# Patient Record
Sex: FEMALE | Race: OTHER | HISPANIC OR LATINO | URBAN - METROPOLITAN AREA
[De-identification: names, ages, dates, MRNs, and addresses within clinical notes are randomized per-mention and may not be internally consistent; named-entity substitution may affect disease eponyms.]

---

## 2019-04-01 PROBLEM — Z00.00 ENCOUNTER FOR PREVENTIVE HEALTH EXAMINATION: Status: ACTIVE | Noted: 2019-04-01

## 2019-09-04 ENCOUNTER — OUTPATIENT (OUTPATIENT)
Dept: OUTPATIENT SERVICES | Facility: HOSPITAL | Age: 34
LOS: 1 days | End: 2019-09-04
Payer: COMMERCIAL

## 2019-09-04 ENCOUNTER — EMERGENCY (EMERGENCY)
Facility: HOSPITAL | Age: 34
LOS: 1 days | Discharge: HOME | End: 2019-09-04
Attending: EMERGENCY MEDICINE
Payer: COMMERCIAL

## 2019-09-04 VITALS
HEART RATE: 99 BPM | HEIGHT: 64 IN | SYSTOLIC BLOOD PRESSURE: 148 MMHG | TEMPERATURE: 98 F | RESPIRATION RATE: 18 BRPM | OXYGEN SATURATION: 99 % | WEIGHT: 209 LBS | DIASTOLIC BLOOD PRESSURE: 84 MMHG

## 2019-09-04 VITALS — RESPIRATION RATE: 16 BRPM | TEMPERATURE: 98 F

## 2019-09-04 VITALS — TEMPERATURE: 99 F | RESPIRATION RATE: 17 BRPM

## 2019-09-04 DIAGNOSIS — Z98.891 HISTORY OF UTERINE SCAR FROM PREVIOUS SURGERY: Chronic | ICD-10-CM

## 2019-09-04 DIAGNOSIS — O11.9 PRE-EXISTING HYPERTENSION WITH PRE-ECLAMPSIA, UNSPECIFIED TRIMESTER: ICD-10-CM

## 2019-09-04 LAB
ALBUMIN SERPL ELPH-MCNC: 3.7 G/DL — SIGNIFICANT CHANGE UP (ref 3.3–5.2)
ALBUMIN SERPL ELPH-MCNC: 3.9 G/DL — SIGNIFICANT CHANGE UP (ref 3.3–5.2)
ALP SERPL-CCNC: 105 U/L — SIGNIFICANT CHANGE UP (ref 40–120)
ALP SERPL-CCNC: 99 U/L — SIGNIFICANT CHANGE UP (ref 40–120)
ALT FLD-CCNC: 24 U/L — SIGNIFICANT CHANGE UP
ALT FLD-CCNC: 25 U/L — SIGNIFICANT CHANGE UP
ANION GAP SERPL CALC-SCNC: 11 MMOL/L — SIGNIFICANT CHANGE UP (ref 5–17)
ANION GAP SERPL CALC-SCNC: 13 MMOL/L — SIGNIFICANT CHANGE UP (ref 5–17)
APPEARANCE UR: CLEAR — SIGNIFICANT CHANGE UP
APTT BLD: 31.5 SEC — SIGNIFICANT CHANGE UP (ref 27.5–36.3)
AST SERPL-CCNC: 15 U/L — SIGNIFICANT CHANGE UP
AST SERPL-CCNC: 16 U/L — SIGNIFICANT CHANGE UP
BASOPHILS # BLD AUTO: 0.02 K/UL — SIGNIFICANT CHANGE UP (ref 0–0.2)
BASOPHILS # BLD AUTO: 0.04 K/UL — SIGNIFICANT CHANGE UP (ref 0–0.2)
BASOPHILS NFR BLD AUTO: 0.3 % — SIGNIFICANT CHANGE UP (ref 0–2)
BASOPHILS NFR BLD AUTO: 0.5 % — SIGNIFICANT CHANGE UP (ref 0–2)
BILIRUB SERPL-MCNC: 0.3 MG/DL — LOW (ref 0.4–2)
BILIRUB SERPL-MCNC: 0.3 MG/DL — LOW (ref 0.4–2)
BILIRUB UR-MCNC: NEGATIVE — SIGNIFICANT CHANGE UP
BUN SERPL-MCNC: 11 MG/DL — SIGNIFICANT CHANGE UP (ref 8–20)
BUN SERPL-MCNC: 11 MG/DL — SIGNIFICANT CHANGE UP (ref 8–20)
CALCIUM SERPL-MCNC: 9.5 MG/DL — SIGNIFICANT CHANGE UP (ref 8.6–10.2)
CALCIUM SERPL-MCNC: 9.7 MG/DL — SIGNIFICANT CHANGE UP (ref 8.6–10.2)
CHLORIDE SERPL-SCNC: 108 MMOL/L — HIGH (ref 98–107)
CHLORIDE SERPL-SCNC: 109 MMOL/L — HIGH (ref 98–107)
CO2 SERPL-SCNC: 22 MMOL/L — SIGNIFICANT CHANGE UP (ref 22–29)
CO2 SERPL-SCNC: 24 MMOL/L — SIGNIFICANT CHANGE UP (ref 22–29)
COLOR SPEC: YELLOW — SIGNIFICANT CHANGE UP
CREAT ?TM UR-MCNC: 140 MG/DL — SIGNIFICANT CHANGE UP
CREAT SERPL-MCNC: 0.4 MG/DL — LOW (ref 0.5–1.3)
CREAT SERPL-MCNC: 0.54 MG/DL — SIGNIFICANT CHANGE UP (ref 0.5–1.3)
DIFF PNL FLD: ABNORMAL
EOSINOPHIL # BLD AUTO: 0.27 K/UL — SIGNIFICANT CHANGE UP (ref 0–0.5)
EOSINOPHIL # BLD AUTO: 0.29 K/UL — SIGNIFICANT CHANGE UP (ref 0–0.5)
EOSINOPHIL NFR BLD AUTO: 3.2 % — SIGNIFICANT CHANGE UP (ref 0–6)
EOSINOPHIL NFR BLD AUTO: 3.7 % — SIGNIFICANT CHANGE UP (ref 0–6)
EPI CELLS # UR: SIGNIFICANT CHANGE UP
FIBRINOGEN PPP-MCNC: 620 MG/DL — HIGH (ref 350–510)
GLUCOSE SERPL-MCNC: 88 MG/DL — SIGNIFICANT CHANGE UP (ref 70–115)
GLUCOSE SERPL-MCNC: 89 MG/DL — SIGNIFICANT CHANGE UP (ref 70–115)
GLUCOSE UR QL: NEGATIVE MG/DL — SIGNIFICANT CHANGE UP
HCT VFR BLD CALC: 33.9 % — LOW (ref 34.5–45)
HCT VFR BLD CALC: 35.5 % — SIGNIFICANT CHANGE UP (ref 34.5–45)
HGB BLD-MCNC: 11.4 G/DL — LOW (ref 11.5–15.5)
HGB BLD-MCNC: 11.5 G/DL — SIGNIFICANT CHANGE UP (ref 11.5–15.5)
IMM GRANULOCYTES NFR BLD AUTO: 1 % — SIGNIFICANT CHANGE UP (ref 0–1.5)
IMM GRANULOCYTES NFR BLD AUTO: 1.1 % — SIGNIFICANT CHANGE UP (ref 0–1.5)
INR BLD: 0.98 RATIO — SIGNIFICANT CHANGE UP (ref 0.88–1.16)
KETONES UR-MCNC: ABNORMAL
LDH SERPL L TO P-CCNC: 194 U/L — HIGH (ref 98–192)
LEUKOCYTE ESTERASE UR-ACNC: ABNORMAL
LYMPHOCYTES # BLD AUTO: 2.32 K/UL — SIGNIFICANT CHANGE UP (ref 1–3.3)
LYMPHOCYTES # BLD AUTO: 2.38 K/UL — SIGNIFICANT CHANGE UP (ref 1–3.3)
LYMPHOCYTES # BLD AUTO: 27.3 % — SIGNIFICANT CHANGE UP (ref 13–44)
LYMPHOCYTES # BLD AUTO: 30.4 % — SIGNIFICANT CHANGE UP (ref 13–44)
MAGNESIUM SERPL-MCNC: 2.1 MG/DL — SIGNIFICANT CHANGE UP (ref 1.6–2.6)
MCHC RBC-ENTMCNC: 31.1 PG — SIGNIFICANT CHANGE UP (ref 27–34)
MCHC RBC-ENTMCNC: 32.1 PG — SIGNIFICANT CHANGE UP (ref 27–34)
MCHC RBC-ENTMCNC: 32.4 GM/DL — SIGNIFICANT CHANGE UP (ref 32–36)
MCHC RBC-ENTMCNC: 33.6 GM/DL — SIGNIFICANT CHANGE UP (ref 32–36)
MCV RBC AUTO: 95.5 FL — SIGNIFICANT CHANGE UP (ref 80–100)
MCV RBC AUTO: 95.9 FL — SIGNIFICANT CHANGE UP (ref 80–100)
MONOCYTES # BLD AUTO: 0.43 K/UL — SIGNIFICANT CHANGE UP (ref 0–0.9)
MONOCYTES # BLD AUTO: 0.5 K/UL — SIGNIFICANT CHANGE UP (ref 0–0.9)
MONOCYTES NFR BLD AUTO: 5.5 % — SIGNIFICANT CHANGE UP (ref 2–14)
MONOCYTES NFR BLD AUTO: 5.9 % — SIGNIFICANT CHANGE UP (ref 2–14)
NEUTROPHILS # BLD AUTO: 4.64 K/UL — SIGNIFICANT CHANGE UP (ref 1.8–7.4)
NEUTROPHILS # BLD AUTO: 5.29 K/UL — SIGNIFICANT CHANGE UP (ref 1.8–7.4)
NEUTROPHILS NFR BLD AUTO: 59.1 % — SIGNIFICANT CHANGE UP (ref 43–77)
NEUTROPHILS NFR BLD AUTO: 62 % — SIGNIFICANT CHANGE UP (ref 43–77)
NITRITE UR-MCNC: NEGATIVE — SIGNIFICANT CHANGE UP
PH UR: 6 — SIGNIFICANT CHANGE UP (ref 5–8)
PLATELET # BLD AUTO: 417 K/UL — HIGH (ref 150–400)
PLATELET # BLD AUTO: 423 K/UL — HIGH (ref 150–400)
POTASSIUM SERPL-MCNC: 3.8 MMOL/L — SIGNIFICANT CHANGE UP (ref 3.5–5.3)
POTASSIUM SERPL-MCNC: 4.3 MMOL/L — SIGNIFICANT CHANGE UP (ref 3.5–5.3)
POTASSIUM SERPL-SCNC: 3.8 MMOL/L — SIGNIFICANT CHANGE UP (ref 3.5–5.3)
POTASSIUM SERPL-SCNC: 4.3 MMOL/L — SIGNIFICANT CHANGE UP (ref 3.5–5.3)
PROT ?TM UR-MCNC: 17 MG/DL — HIGH (ref 0–12)
PROT SERPL-MCNC: 6.5 G/DL — LOW (ref 6.6–8.7)
PROT SERPL-MCNC: 7 G/DL — SIGNIFICANT CHANGE UP (ref 6.6–8.7)
PROT UR-MCNC: 15 MG/DL
PROT/CREAT UR-RTO: 0.1 RATIO — SIGNIFICANT CHANGE UP
PROTHROM AB SERPL-ACNC: 11.3 SEC — SIGNIFICANT CHANGE UP (ref 10–12.9)
RBC # BLD: 3.55 M/UL — LOW (ref 3.8–5.2)
RBC # BLD: 3.7 M/UL — LOW (ref 3.8–5.2)
RBC # FLD: 13.5 % — SIGNIFICANT CHANGE UP (ref 10.3–14.5)
RBC # FLD: 13.5 % — SIGNIFICANT CHANGE UP (ref 10.3–14.5)
RBC CASTS # UR COMP ASSIST: ABNORMAL /HPF (ref 0–4)
SODIUM SERPL-SCNC: 143 MMOL/L — SIGNIFICANT CHANGE UP (ref 135–145)
SODIUM SERPL-SCNC: 144 MMOL/L — SIGNIFICANT CHANGE UP (ref 135–145)
SP GR SPEC: 1.02 — SIGNIFICANT CHANGE UP (ref 1.01–1.02)
URATE SERPL-MCNC: 6 MG/DL — HIGH (ref 2.4–5.7)
UROBILINOGEN FLD QL: 4 MG/DL
WBC # BLD: 7.84 K/UL — SIGNIFICANT CHANGE UP (ref 3.8–10.5)
WBC # BLD: 8.51 K/UL — SIGNIFICANT CHANGE UP (ref 3.8–10.5)
WBC # FLD AUTO: 7.84 K/UL — SIGNIFICANT CHANGE UP (ref 3.8–10.5)
WBC # FLD AUTO: 8.51 K/UL — SIGNIFICANT CHANGE UP (ref 3.8–10.5)
WBC UR QL: SIGNIFICANT CHANGE UP

## 2019-09-04 PROCEDURE — 84550 ASSAY OF BLOOD/URIC ACID: CPT

## 2019-09-04 PROCEDURE — 96372 THER/PROPH/DIAG INJ SC/IM: CPT

## 2019-09-04 PROCEDURE — 80053 COMPREHEN METABOLIC PANEL: CPT

## 2019-09-04 PROCEDURE — 36415 COLL VENOUS BLD VENIPUNCTURE: CPT

## 2019-09-04 PROCEDURE — 85027 COMPLETE CBC AUTOMATED: CPT

## 2019-09-04 PROCEDURE — 99283 EMERGENCY DEPT VISIT LOW MDM: CPT

## 2019-09-04 PROCEDURE — 81001 URINALYSIS AUTO W/SCOPE: CPT

## 2019-09-04 PROCEDURE — 99284 EMERGENCY DEPT VISIT MOD MDM: CPT

## 2019-09-04 PROCEDURE — G0463: CPT

## 2019-09-04 PROCEDURE — 85610 PROTHROMBIN TIME: CPT

## 2019-09-04 PROCEDURE — 83735 ASSAY OF MAGNESIUM: CPT

## 2019-09-04 PROCEDURE — 85730 THROMBOPLASTIN TIME PARTIAL: CPT

## 2019-09-04 PROCEDURE — 82570 ASSAY OF URINE CREATININE: CPT

## 2019-09-04 PROCEDURE — 85384 FIBRINOGEN ACTIVITY: CPT

## 2019-09-04 PROCEDURE — 83615 LACTATE (LD) (LDH) ENZYME: CPT

## 2019-09-04 PROCEDURE — 84156 ASSAY OF PROTEIN URINE: CPT

## 2019-09-04 RX ORDER — METFORMIN HYDROCHLORIDE 850 MG/1
1 TABLET ORAL
Qty: 0 | Refills: 0 | DISCHARGE

## 2019-09-04 RX ORDER — LABETALOL HCL 100 MG
200 TABLET ORAL
Refills: 0 | Status: DISCONTINUED | OUTPATIENT
Start: 2019-09-04 | End: 2019-09-19

## 2019-09-04 RX ORDER — LABETALOL HCL 100 MG
1 TABLET ORAL
Qty: 28 | Refills: 0
Start: 2019-09-04 | End: 2019-09-17

## 2019-09-04 RX ORDER — LABETALOL HCL 100 MG
20 TABLET ORAL ONCE
Refills: 0 | Status: COMPLETED | OUTPATIENT
Start: 2019-09-04 | End: 2019-09-04

## 2019-09-04 RX ADMIN — Medication 200 MILLIGRAM(S): at 22:11

## 2019-09-04 RX ADMIN — Medication 20 MILLIGRAM(S): at 21:11

## 2019-09-04 NOTE — ED PROVIDER NOTE - CLINICAL SUMMARY MEDICAL DECISION MAKING FREE TEXT BOX
pt with htn post partum, had preeclampsia, mild tingling and swelling LE. labs sent, [facility] D accepting pt for management.

## 2019-09-04 NOTE — ED ADULT TRIAGE NOTE - CHIEF COMPLAINT QUOTE
Pt delivered last Wednesday pt was on Mag for HBP pt had HBP during pregnancy and last 2 days pt's BP has been high and was told to come to ED from [facility] for HTN & post partum edema.

## 2019-09-04 NOTE — ED ADULT TRIAGE NOTE - NS ED NURSE BANDS TYPE
Patient transferred to [facility] with myself and Jw Teran with 1 bag of belongins with security.  called and informed of transfer to  2. Patient searched for contraband. SVC maintained.    Name band;

## 2019-09-04 NOTE — ED PROVIDER NOTE - OBJECTIVE STATEMENT
35 y/o  with full term delivery last week with preeclampsia present with htn at home. Family monitoring bp at home and was ~150/90. Pt states has had some b/l tingling fingers and toes, mild swelling b/l LE, ongoing for many days/weeks, no acute change. Headache yesterday, none today, no vision changes, no other neurlogic symptoms. No cp, sob/abd pain. Pt had csectiona nd healing well, no fever.     ROS: No fever/chills. No eye pain/changes in vision, No ear pain/sore throat/dysphagia, No chest pain/palpitations. No SOB/cough/. No abdominal pain, N/V/D, no black/bloody bm. No dysuria/frequency/discharge, No Dizziness.    No rashes or breaks in skin.

## 2019-09-04 NOTE — ED PROVIDER NOTE - PHYSICAL EXAMINATION
Problem: Spiritual Distress, Risk/Actual (Adult,Obstetrics,Pediatric)  Intervention: Facilitate Personal Exploration/Expression of Spirituality  Provided initial visit. Introduced and offered pastoral support with prayer upon request. Pt made aware of 's presence as needed.          Gen: No acute distress, non toxic  HEENT: Mucous membranes moist, pink conjunctivae, EOMI  CV: RRR, nl s1/s2.  Resp: CTAB, normal rate and effort  GI: Abdomen soft, NT, ND. No rebound, no guarding. c section healing, mild soreness but no rebound/guarding.   : No CVAT  Neuro: A&O x 3, moving all 4 extremities. nl finger to nose.   MSK: No spine or joint tenderness to palpation  Skin: No rashes. intact and perfused.

## 2019-09-04 NOTE — OB PROVIDER TRIAGE NOTE - HISTORY OF PRESENT ILLNESS
Patient is a 35yo  POD 7 from a pCS complicated by pre-eclampsia s/p magnesium. She currently has a headache, has been seeing "stars" for the past two days. Earlier today after a nap, her  measured her Bp and got 159/113 on home device. Abdomen is mildly tender, patient c/o swelling in her hands and feet along with numbness and tingling. Her feet are very tender and have been since delivery. She denies any fevers or chills, and her incision is clean, dry and intact. She denies any nausea, vomiting.     Pregnancy complicated by: GDMa2, pre-eclampsia    PMH: Hypertension, patient took oral medication 4 yrs ago for a year but has been unmedicated since then. PCOS  PSH: hemorrhoidectomy   Meds:PNV, ibuprofen, percocet s/p pCS, metformin 500mg BID  Allergies: Latex  OBhx: SAB     Physical exam:  General: patient is resting in bed in NAD  Cardio: RRR  Pulm: CTABL  Abd: tender s/p pCS      A/P: Patient is a 35yo  POD#7 for r/o PP pre-eclampsia  -SBP >160 x2 15 minutes apart, 20mg labetalol IV push  -Observe and recheck BP Q20 minutes  -Patient already received PP magnesium s/p pCS at [facility], therefore will not start mag here. Will start on PO labetalol 200mg BID.   -Continue to monitor Patient is a 33yo  POD 7 from a pCS complicated by pre-eclampsia s/p magnesium. She currently has a headache, has been seeing "stars" for the past two days. Earlier today after a nap, her  measured her Bp and got 159/113 on home device. Abdomen is mildly tender, patient c/o swelling in her hands and feet along with numbness and tingling. Her feet are very tender and have been since delivery. She denies any fevers, chills, nausea, vomiting.     Pregnancy was complicated by: GDMa2, pre-eclampsia    PMH: Hypertension, patient took oral medication 4 yrs ago for a year but has been unmedicated since then. PCOS  PSH: hemorrhoidectomy   Meds: PNV, metformin 500mg BID (was taking for A2GDM and is still taking?)  Allergies: Latex  OBhx: SAB , pCS POD#7    Physical exam:  General: patient is resting in bed in NAD  Cardio: RRR  Pulm: CTABL  Abd: tender s/p pCS, incision is clean, dry and intact    A/P: Patient is a 33yo  POD#7 for r/o PP pre-eclampsia  -SBP >160 x2 15 minutes apart, 20mg labetalol IV push  -Observe and recheck BP Q20 minutes  -Patient already received PP magnesium s/p pCS at [facility], therefore will not start mag here. Will start on PO labetalol 200mg BID.   -Continue to monitor Patient is a 35yo  POD 7 from a pCS complicated by pre-eclampsia s/p magnesium. She currently has a headache, has been seeing "stars" for the past two days. Earlier today after a nap, her  measured her Bp and got 159/113 on home device. Abdomen is mildly tender, patient c/o swelling in her hands and feet along with numbness and tingling. Her feet are very tender and have been since delivery. She denies any fevers, chills, nausea, vomiting.     Pregnancy was complicated by: GDMa2, pre-eclampsia    PMH: Hypertension, patient took oral medication 4 yrs ago for a year but has been unmedicated since then. PCOS  PSH: hemorrhoidectomy   Meds: PNV, metformin 500mg BID (was taking for A2GDM and is still taking?)  Allergies: Latex  OBhx: SAB , pCS POD#7    Physical exam:  General: patient is resting in bed in NAD  Cardio: RRR  Pulm: CTABL  Abd: tender s/p pCS, incision is clean, dry and intact    A/P: Patient is a 35yo  POD#7 for r/o PP pre-eclampsia  -SBP >160 x2 15 minutes apart, 20mg labetalol IV push  -Observe and recheck BP Q20 minutes  -Patient already received PP magnesium s/p pCS at [facility], therefore will not start mag here. Will start on PO labetalol 200mg BID.   -Pressures normalized while under observation  -Patient was d/c with prescription for labetalol 200mg BID, and encouraged to follow up with PCP within 1 week.

## 2019-09-04 NOTE — ED STATDOCS - PROGRESS NOTE DETAILS
33 y/o F pt with no significant PMHx presents to the ED c/o hypertension. Pt is one week post-partum, and was discharged from the hospital with no medication for her HTN. Today she visited [facility], and at home per pt's  at the bedside her pressure has been 150/100. [facility] called. 33 y/o F pt with no significant PMHx presents to the ED c/o hypertension and FILOMENA pedal edema. Pt is one week post-partum, and was discharged from the hospital with no medication for her HTN. Today she visited [facility], and at home per pt's  at the bedside her pressure has been 150/100. [facility] called.

## 2023-04-13 ENCOUNTER — OFFICE (OUTPATIENT)
Dept: URBAN - METROPOLITAN AREA CLINIC 104 | Facility: CLINIC | Age: 38
Setting detail: OPHTHALMOLOGY
End: 2023-04-13
Payer: MEDICAID

## 2023-04-13 DIAGNOSIS — H11.32: ICD-10-CM

## 2023-04-13 DIAGNOSIS — H01.005: ICD-10-CM

## 2023-04-13 DIAGNOSIS — E11.9: ICD-10-CM

## 2023-04-13 DIAGNOSIS — H04.123: ICD-10-CM

## 2023-04-13 DIAGNOSIS — H35.033: ICD-10-CM

## 2023-04-13 DIAGNOSIS — H01.004: ICD-10-CM

## 2023-04-13 DIAGNOSIS — H01.001: ICD-10-CM

## 2023-04-13 DIAGNOSIS — H01.002: ICD-10-CM

## 2023-04-13 PROBLEM — H53.143: Status: ACTIVE | Noted: 2023-04-13

## 2023-04-13 PROCEDURE — 92004 COMPRE OPH EXAM NEW PT 1/>: CPT | Performed by: OPTOMETRIST

## 2023-04-13 ASSESSMENT — KERATOMETRY
OS_K1POWER_DIOPTERS: 38.27
OD_K1POWER_DIOPTERS: 38.66
OS_K2POWER_DIOPTERS: 40.27
OD_K2POWER_DIOPTERS: 40.37
OS_AXISANGLE_DEGREES: 110
OD_AXISANGLE_DEGREES: 091

## 2023-04-13 ASSESSMENT — CONFRONTATIONAL VISUAL FIELD TEST (CVF)
OS_FINDINGS: FULL
OD_FINDINGS: FULL

## 2023-04-13 ASSESSMENT — VISUAL ACUITY
OD_BCVA: 20/30
OS_BCVA: 20/25

## 2023-04-13 ASSESSMENT — REFRACTION_AUTOREFRACTION
OD_AXIS: 009
OS_SPHERE: 0.00
OD_SPHERE: 0.00
OS_CYLINDER: -1.50
OS_AXIS: 023
OD_CYLINDER: -1.25

## 2023-04-13 ASSESSMENT — LID EXAM ASSESSMENTS
OS_BLEPHARITIS: LLL LUL 1+
OD_BLEPHARITIS: RLL RUL 1+

## 2023-04-13 ASSESSMENT — TONOMETRY
OS_IOP_MMHG: 16
OD_IOP_MMHG: 16

## 2023-04-13 ASSESSMENT — SPHEQUIV_DERIVED
OS_SPHEQUIV: -0.75
OD_SPHEQUIV: -0.625

## 2023-04-13 ASSESSMENT — AXIALLENGTH_DERIVED
OD_AL: 25.43
OS_AL: 25.59

## 2023-07-27 ENCOUNTER — EMERGENCY (EMERGENCY)
Facility: HOSPITAL | Age: 38
LOS: 1 days | Discharge: HOME | End: 2023-07-27
Attending: EMERGENCY MEDICINE
Payer: MEDICAID

## 2023-07-27 VITALS
TEMPERATURE: 98 F | WEIGHT: 199.96 LBS | RESPIRATION RATE: 16 BRPM | HEART RATE: 88 BPM | OXYGEN SATURATION: 98 % | DIASTOLIC BLOOD PRESSURE: 105 MMHG | SYSTOLIC BLOOD PRESSURE: 157 MMHG

## 2023-07-27 VITALS
SYSTOLIC BLOOD PRESSURE: 122 MMHG | HEART RATE: 85 BPM | TEMPERATURE: 99 F | OXYGEN SATURATION: 97 % | DIASTOLIC BLOOD PRESSURE: 77 MMHG | RESPIRATION RATE: 18 BRPM

## 2023-07-27 DIAGNOSIS — Z98.891 HISTORY OF UTERINE SCAR FROM PREVIOUS SURGERY: Chronic | ICD-10-CM

## 2023-07-27 LAB
ALBUMIN SERPL ELPH-MCNC: 3.8 G/DL — SIGNIFICANT CHANGE UP (ref 3.5–5)
ALP SERPL-CCNC: 63 U/L — SIGNIFICANT CHANGE UP (ref 40–120)
ALT FLD-CCNC: 104 U/L DA — HIGH (ref 10–60)
ANION GAP SERPL CALC-SCNC: 12 MMOL/L — SIGNIFICANT CHANGE UP (ref 5–17)
AST SERPL-CCNC: 38 U/L — SIGNIFICANT CHANGE UP (ref 10–40)
BASOPHILS # BLD AUTO: 0.05 K/UL — SIGNIFICANT CHANGE UP (ref 0–0.2)
BASOPHILS NFR BLD AUTO: 0.5 % — SIGNIFICANT CHANGE UP (ref 0–2)
BILIRUB SERPL-MCNC: 0.3 MG/DL — SIGNIFICANT CHANGE UP (ref 0.2–1.2)
BLD GP AB SCN SERPL QL: SIGNIFICANT CHANGE UP
BUN SERPL-MCNC: 18 MG/DL — SIGNIFICANT CHANGE UP (ref 7–18)
CALCIUM SERPL-MCNC: 9 MG/DL — SIGNIFICANT CHANGE UP (ref 8.4–10.5)
CHLORIDE SERPL-SCNC: 104 MMOL/L — SIGNIFICANT CHANGE UP (ref 96–108)
CO2 SERPL-SCNC: 21 MMOL/L — LOW (ref 22–31)
CREAT SERPL-MCNC: 0.83 MG/DL — SIGNIFICANT CHANGE UP (ref 0.5–1.3)
EGFR: 91 ML/MIN/1.73M2 — SIGNIFICANT CHANGE UP
EOSINOPHIL # BLD AUTO: 0.2 K/UL — SIGNIFICANT CHANGE UP (ref 0–0.5)
EOSINOPHIL NFR BLD AUTO: 2.2 % — SIGNIFICANT CHANGE UP (ref 0–6)
GLUCOSE SERPL-MCNC: 244 MG/DL — HIGH (ref 70–99)
HCG SERPL-ACNC: 5582 MIU/ML — HIGH
HCT VFR BLD CALC: 37.6 % — SIGNIFICANT CHANGE UP (ref 34.5–45)
HGB BLD-MCNC: 12.5 G/DL — SIGNIFICANT CHANGE UP (ref 11.5–15.5)
IMM GRANULOCYTES NFR BLD AUTO: 0.3 % — SIGNIFICANT CHANGE UP (ref 0–0.9)
LIDOCAIN IGE QN: 128 U/L — SIGNIFICANT CHANGE UP (ref 73–393)
LYMPHOCYTES # BLD AUTO: 2.34 K/UL — SIGNIFICANT CHANGE UP (ref 1–3.3)
LYMPHOCYTES # BLD AUTO: 25.2 % — SIGNIFICANT CHANGE UP (ref 13–44)
MCHC RBC-ENTMCNC: 31.1 PG — SIGNIFICANT CHANGE UP (ref 27–34)
MCHC RBC-ENTMCNC: 33.2 GM/DL — SIGNIFICANT CHANGE UP (ref 32–36)
MCV RBC AUTO: 93.5 FL — SIGNIFICANT CHANGE UP (ref 80–100)
MONOCYTES # BLD AUTO: 0.53 K/UL — SIGNIFICANT CHANGE UP (ref 0–0.9)
MONOCYTES NFR BLD AUTO: 5.7 % — SIGNIFICANT CHANGE UP (ref 2–14)
NEUTROPHILS # BLD AUTO: 6.12 K/UL — SIGNIFICANT CHANGE UP (ref 1.8–7.4)
NEUTROPHILS NFR BLD AUTO: 66.1 % — SIGNIFICANT CHANGE UP (ref 43–77)
NRBC # BLD: 0 /100 WBCS — SIGNIFICANT CHANGE UP (ref 0–0)
PLATELET # BLD AUTO: 294 K/UL — SIGNIFICANT CHANGE UP (ref 150–400)
POTASSIUM SERPL-MCNC: 4 MMOL/L — SIGNIFICANT CHANGE UP (ref 3.5–5.3)
POTASSIUM SERPL-SCNC: 4 MMOL/L — SIGNIFICANT CHANGE UP (ref 3.5–5.3)
PROT SERPL-MCNC: 7.1 G/DL — SIGNIFICANT CHANGE UP (ref 6–8.3)
RBC # BLD: 4.02 M/UL — SIGNIFICANT CHANGE UP (ref 3.8–5.2)
RBC # FLD: 14.7 % — HIGH (ref 10.3–14.5)
SODIUM SERPL-SCNC: 137 MMOL/L — SIGNIFICANT CHANGE UP (ref 135–145)
WBC # BLD: 9.27 K/UL — SIGNIFICANT CHANGE UP (ref 3.8–10.5)
WBC # FLD AUTO: 9.27 K/UL — SIGNIFICANT CHANGE UP (ref 3.8–10.5)

## 2023-07-27 PROCEDURE — 76817 TRANSVAGINAL US OBSTETRIC: CPT | Mod: 26

## 2023-07-27 PROCEDURE — 86901 BLOOD TYPING SEROLOGIC RH(D): CPT

## 2023-07-27 PROCEDURE — 80053 COMPREHEN METABOLIC PANEL: CPT

## 2023-07-27 PROCEDURE — 99284 EMERGENCY DEPT VISIT MOD MDM: CPT | Mod: 25

## 2023-07-27 PROCEDURE — 86850 RBC ANTIBODY SCREEN: CPT

## 2023-07-27 PROCEDURE — 83690 ASSAY OF LIPASE: CPT

## 2023-07-27 PROCEDURE — 86900 BLOOD TYPING SEROLOGIC ABO: CPT

## 2023-07-27 PROCEDURE — 85025 COMPLETE CBC W/AUTO DIFF WBC: CPT

## 2023-07-27 PROCEDURE — 76801 OB US < 14 WKS SINGLE FETUS: CPT | Mod: 26

## 2023-07-27 PROCEDURE — 76801 OB US < 14 WKS SINGLE FETUS: CPT

## 2023-07-27 PROCEDURE — 84702 CHORIONIC GONADOTROPIN TEST: CPT

## 2023-07-27 PROCEDURE — 36415 COLL VENOUS BLD VENIPUNCTURE: CPT

## 2023-07-27 PROCEDURE — 96374 THER/PROPH/DIAG INJ IV PUSH: CPT

## 2023-07-27 PROCEDURE — 76817 TRANSVAGINAL US OBSTETRIC: CPT

## 2023-07-27 PROCEDURE — 99285 EMERGENCY DEPT VISIT HI MDM: CPT

## 2023-07-27 RX ORDER — MORPHINE SULFATE 50 MG/1
2 CAPSULE, EXTENDED RELEASE ORAL ONCE
Refills: 0 | Status: DISCONTINUED | OUTPATIENT
Start: 2023-07-27 | End: 2023-07-27

## 2023-07-27 RX ADMIN — MORPHINE SULFATE 2 MILLIGRAM(S): 50 CAPSULE, EXTENDED RELEASE ORAL at 18:12

## 2023-07-27 NOTE — ED PROVIDER NOTE - PATIENT PORTAL LINK FT
You can access the FollowMyHealth Patient Portal offered by [facility] by registering at the following website: [email or web address]. By joining [facility]’s FollowMyHealth portal, you will also be able to view your health information using other applications (apps) compatible with our system.

## 2023-07-27 NOTE — ED ADULT NURSE NOTE - OBJECTIVE STATEMENT
Pt presents to the ED complaining of 10/10 lower abdominal pain that radiates to the back x 1 day. Pt is status post miscarriage on Sunday at 5 weeks gestation. Pt said she passed many large clots today. She was started on Progesterone on Friday- unclear as to why. Pt has soaked through 25 pads in 2 hours. Pt appears tearful and anxious. Reports nausea and vomiting without diarrhea. Also denies dizziness or weakness. Pt in acute distress. Awaiting provider orders.

## 2023-07-27 NOTE — ED PROVIDER NOTE - CLINICAL SUMMARY MEDICAL DECISION MAKING FREE TEXT BOX
37 y/o female w/ vaginal bleeding. Recent IUP at 6 weeks. Concern for miscarriage.   Plan: to perform labs and sonogram of pelvis and reassess.

## 2023-07-27 NOTE — ED ADULT NURSE NOTE - ED STAT RN HANDOFF DETAILS 2
Re-evaluated by Dr. Granados with [facility] teaching and instructions rendered. IV access removed an no bleeding noted.

## 2023-07-27 NOTE — ED PROVIDER NOTE - PROGRESS NOTE DETAILS
Patient reassessed reports no further bleeding.  Abdomen is soft nontender.  Cervical os is closed on exam.  Educated patient on lab results and sonogram.  Educated on signs and symptoms to return sooner to ED.  Provided gynecology referral for follow-up.  Answered questions.

## 2023-07-27 NOTE — ED PROVIDER NOTE - OBJECTIVE STATEMENT
37 y/o female, history of diabetes and hypertension, presenting w/ cramping abdominal pain and vaginal bleeding that was worse today. Patient was 6 weeks today by LMP. Had IUP diagnosed last Tuesday, but started to bleed on Sunday and assumed that she had a miscarriage. Reports using 25 pads over the past 2 hours. Reports taking her blood pressure medicine and diabetes medicine today. Denies any fever, chills, dizziness. No known drug allergies.

## 2023-07-27 NOTE — ED PROVIDER NOTE - NSICDXPASTMEDICALHX_GEN_ALL_CORE_FT
PAST MEDICAL HISTORY:  Hypertension, unspecified type       Diabetes     Normal IUP (intrauterine pregnancy) on prenatal ultrasound, first trimester

## 2023-07-27 NOTE — ED ADULT NURSE NOTE - NSFALLUNIVINTERV_ED_ALL_ED
HR=76 bpm, NURJ=681/79 mmhg, SpO2=96.0 %, Resp=18 B/min, EtCO2=33 mmHg, Apnea=2 Seconds, Pain=0, Jose Ramon=10, Bah=2 Bed/Stretcher in lowest position, wheels locked, appropriate side rails in place/Call bell, personal items and telephone in reach/Instruct patient to call for assistance before getting out of bed/chair/stretcher/Non-slip footwear applied when patient is off stretcher/[place] to call system/Physically safe environment - no spills, clutter or unnecessary equipment/Purposeful proactive rounding/Room/bathroom lighting operational, light cord in reach

## 2023-07-28 PROBLEM — I10 ESSENTIAL (PRIMARY) HYPERTENSION: Chronic | Status: ACTIVE | Noted: 2019-09-04
